# Patient Record
Sex: FEMALE | Race: WHITE | NOT HISPANIC OR LATINO | Employment: OTHER | URBAN - METROPOLITAN AREA
[De-identification: names, ages, dates, MRNs, and addresses within clinical notes are randomized per-mention and may not be internally consistent; named-entity substitution may affect disease eponyms.]

---

## 2023-08-21 ENCOUNTER — OFFICE VISIT (OUTPATIENT)
Dept: HEPATOLOGY | Facility: CLINIC | Age: 63
End: 2023-08-21
Payer: COMMERCIAL

## 2023-08-21 ENCOUNTER — HOSPITAL ENCOUNTER (OUTPATIENT)
Dept: RADIOLOGY | Facility: HOSPITAL | Age: 63
Discharge: HOME | End: 2023-08-21
Attending: NURSE PRACTITIONER
Payer: COMMERCIAL

## 2023-08-21 VITALS
SYSTOLIC BLOOD PRESSURE: 154 MMHG | WEIGHT: 132.94 LBS | DIASTOLIC BLOOD PRESSURE: 92 MMHG | HEART RATE: 106 BPM | BODY MASS INDEX: 23.55 KG/M2 | HEIGHT: 63 IN

## 2023-08-21 DIAGNOSIS — K76.9 LIVER DISEASE: ICD-10-CM

## 2023-08-21 DIAGNOSIS — R68.81 EARLY SATIETY: ICD-10-CM

## 2023-08-21 DIAGNOSIS — R10.84 GENERALIZED ABDOMINAL PAIN: ICD-10-CM

## 2023-08-21 DIAGNOSIS — R11.2 NAUSEA AND VOMITING, UNSPECIFIED VOMITING TYPE: ICD-10-CM

## 2023-08-21 DIAGNOSIS — Z85.42 HISTORY OF ENDOMETRIAL CANCER: ICD-10-CM

## 2023-08-21 DIAGNOSIS — C54.1 ENDOMETRIAL CANCER: ICD-10-CM

## 2023-08-21 DIAGNOSIS — R63.4 WEIGHT LOSS, NON-INTENTIONAL: ICD-10-CM

## 2023-08-21 DIAGNOSIS — K74.60 CIRRHOSIS OF LIVER WITH ASCITES, UNSPECIFIED HEPATIC CIRRHOSIS TYPE: Primary | ICD-10-CM

## 2023-08-21 DIAGNOSIS — I10 HYPERTENSION, UNSPECIFIED TYPE: ICD-10-CM

## 2023-08-21 DIAGNOSIS — R18.8 CIRRHOSIS OF LIVER WITH ASCITES, UNSPECIFIED HEPATIC CIRRHOSIS TYPE: Primary | ICD-10-CM

## 2023-08-21 PROCEDURE — 1159F PR MEDICATION LIST DOCUMENTED IN MEDICAL RECORD: ICD-10-PCS | Mod: CPTII,S$GLB,, | Performed by: NURSE PRACTITIONER

## 2023-08-21 PROCEDURE — 3080F DIAST BP >= 90 MM HG: CPT | Mod: CPTII,S$GLB,, | Performed by: NURSE PRACTITIONER

## 2023-08-21 PROCEDURE — 3008F PR BODY MASS INDEX (BMI) DOCUMENTED: ICD-10-PCS | Mod: CPTII,S$GLB,, | Performed by: NURSE PRACTITIONER

## 2023-08-21 PROCEDURE — 74019 RADEX ABDOMEN 2 VIEWS: CPT | Mod: TC

## 2023-08-21 PROCEDURE — 74019 RADEX ABDOMEN 2 VIEWS: CPT | Mod: 26,,, | Performed by: RADIOLOGY

## 2023-08-21 PROCEDURE — 99999 PR PBB SHADOW E&M-NEW PATIENT-LVL IV: CPT | Mod: PBBFAC,,, | Performed by: NURSE PRACTITIONER

## 2023-08-21 PROCEDURE — 3080F PR MOST RECENT DIASTOLIC BLOOD PRESSURE >= 90 MM HG: ICD-10-PCS | Mod: CPTII,S$GLB,, | Performed by: NURSE PRACTITIONER

## 2023-08-21 PROCEDURE — 74019 XR ABDOMEN FLAT AND ERECT: ICD-10-PCS | Mod: 26,,, | Performed by: RADIOLOGY

## 2023-08-21 PROCEDURE — 99205 PR OFFICE/OUTPT VISIT, NEW, LEVL V, 60-74 MIN: ICD-10-PCS | Mod: S$GLB,,, | Performed by: NURSE PRACTITIONER

## 2023-08-21 PROCEDURE — 1159F MED LIST DOCD IN RCRD: CPT | Mod: CPTII,S$GLB,, | Performed by: NURSE PRACTITIONER

## 2023-08-21 PROCEDURE — 99999 PR PBB SHADOW E&M-NEW PATIENT-LVL IV: ICD-10-PCS | Mod: PBBFAC,,, | Performed by: NURSE PRACTITIONER

## 2023-08-21 PROCEDURE — 3077F SYST BP >= 140 MM HG: CPT | Mod: CPTII,S$GLB,, | Performed by: NURSE PRACTITIONER

## 2023-08-21 PROCEDURE — 3008F BODY MASS INDEX DOCD: CPT | Mod: CPTII,S$GLB,, | Performed by: NURSE PRACTITIONER

## 2023-08-21 PROCEDURE — 99205 OFFICE O/P NEW HI 60 MIN: CPT | Mod: S$GLB,,, | Performed by: NURSE PRACTITIONER

## 2023-08-21 PROCEDURE — 3077F PR MOST RECENT SYSTOLIC BLOOD PRESSURE >= 140 MM HG: ICD-10-PCS | Mod: CPTII,S$GLB,, | Performed by: NURSE PRACTITIONER

## 2023-08-21 RX ORDER — IRBESARTAN AND HYDROCHLOROTHIAZIDE 150; 12.5 MG/1; MG/1
1 TABLET, FILM COATED ORAL
COMMUNITY
Start: 2023-05-24

## 2023-08-21 RX ORDER — ALPRAZOLAM 1 MG/1
1 TABLET ORAL DAILY PRN
COMMUNITY
Start: 2023-07-18

## 2023-08-21 RX ORDER — ONDANSETRON 4 MG/1
4 TABLET, FILM COATED ORAL EVERY 8 HOURS PRN
COMMUNITY
Start: 2023-07-18

## 2023-08-21 RX ORDER — SPIRONOLACTONE 50 MG/1
50 TABLET, FILM COATED ORAL
COMMUNITY
Start: 2022-09-29

## 2023-08-21 RX ORDER — CYCLOBENZAPRINE HCL 5 MG
5 TABLET ORAL
COMMUNITY
Start: 2023-07-18

## 2023-08-21 RX ORDER — PANTOPRAZOLE SODIUM 40 MG/1
40 TABLET, DELAYED RELEASE ORAL
COMMUNITY
Start: 2023-08-01

## 2023-08-21 NOTE — Clinical Note
Please get me a copy of her EGD and colonocopy.  I can't recall where she had it completed.  Reach out to pt for clairifcation

## 2023-08-21 NOTE — PROGRESS NOTES
"Clinic Consult:  [facility] Consultation Note    Reason for Consult:  The primary encounter diagnosis was Cirrhosis of liver with ascites, unspecified hepatic cirrhosis type. Diagnoses of Generalized abdominal pain, Weight loss, non-intentional, Early satiety, Nausea and vomiting, unspecified vomiting type, History of endometrial cancer, Hypertension, unspecified type, Liver disease, and Endometrial cancer were also pertinent to this visit.    PCP: Geoffrey Mendosa   No address on file    HPI:  This is a 63 y.o. female here for evaluation of the above  Pt is here with a family member, present throughout the visit.   She states that all of her symptoms started in Sugust 2022.  At that time, she began having nausea, vomiting, early satiety, loss of appetite and thus weight loss.  She has lost over 100 pounds since that time.   She was seen by a GI provider in [facility] and reports an EGD and coloospcy were completed, reportedly unremarkable.  I do not have that result for review.   Imaging in septemeber 2022 was completed which showed a cirrhotic liver without lesions.  Evidence of portal HTN and small volume ascites.   She denies any knopwn liver disease prior to that time.   Extensive record review notes a workup with labs that showed no significant findings for autoimmune, cholestatis or active viral infection.   Pt reports she was told she possibly had a previous exposure to hepatitis C, but was "dormant".   She has a PMH of HTN and obesity.  No ETOH.       In January, she was seen again for the same complaints and was started on Lasix and Aldactone for one month.   She conitnued with complaints of the aboce and a GES was completed, unremarkable per records review.     Today, she has continued complaints of generalized abdominal pain with loss of appetitie due to early satiety with nausea and vomiting.   Per Care Everywhere, a CT was completed on 8/15/23 that showed cirrhosis with small " "volume ascites with low in central abdominal peritoneal thickening.   No lesion or mass noted, however, not triple phase.     Of note, she has a hx of endometrial cancer, s/p hysterectomy in 2019.     Pt also reports a hx of constipation.       Review of Systems   Constitutional:  Positive for malaise/fatigue and weight loss. Negative for chills and fever.   Respiratory:  Negative for cough.    Cardiovascular:  Negative for chest pain.   Gastrointestinal:         Per HPI   Musculoskeletal:  Positive for myalgias.   Skin:  Negative for itching and rash.   Neurological:  Negative for headaches.   Psychiatric/Behavioral:  The patient is not nervous/anxious.        Medical History:   Past Medical History:   Diagnosis Date    Cirrhosis     Endometrial cancer     HTN (hypertension)     Liver disease        Surgical History:  Past Surgical History:   Procedure Laterality Date    HYSTERECTOMY         Family History:   No family history on file.    Social History:        Allergies: Reviewed    Home Medications:   Current Outpatient Medications on File Prior to Visit   Medication Sig Dispense Refill    irbesartan-hydrochlorothiazide (AVALIDE) 150-12.5 mg per tablet Take 1 tablet by mouth.      ondansetron (ZOFRAN) 4 MG tablet Take 4 mg by mouth every 8 (eight) hours as needed.      pantoprazole (PROTONIX) 40 MG tablet Take 40 mg by mouth.      ALPRAZolam (XANAX) 1 MG tablet Take 1 mg by mouth daily as needed.      cyclobenzaprine (FLEXERIL) 5 MG tablet Take 5 mg by mouth.      spironolactone (ALDACTONE) 50 MG tablet 50 mg.       No current facility-administered medications on file prior to visit.       Physical Exam:  Vital Signs:  BP (!) 154/92   Pulse 106   Ht 5' 3" (1.6 m)   Wt 60.3 kg (132 lb 15 oz)   BMI 23.55 kg/m²   Body mass index is 23.55 kg/m².  Physical Exam  Vitals reviewed.   Constitutional:       Appearance: She is well-developed. She is ill-appearing.   HENT:      Head: Atraumatic.   Eyes:      General: No " scleral icterus.  Cardiovascular:      Rate and Rhythm: Normal rate.   Pulmonary:      Effort: Pulmonary effort is normal.   Abdominal:      General: There is no distension.      Palpations: Abdomen is soft.   Musculoskeletal:         General: Normal range of motion.      Cervical back: Normal range of motion.   Skin:     General: Skin is dry.   Neurological:      Mental Status: She is alert and oriented to person, place, and time.         Labs: Pertinent labs reviewed.      Assessment:  1. Cirrhosis of liver with ascites, unspecified hepatic cirrhosis type    2. Generalized abdominal pain    3. Weight loss, non-intentional    4. Early satiety    5. Nausea and vomiting, unspecified vomiting type    6. History of endometrial cancer    7. Hypertension, unspecified type    8. Liver disease    9. Endometrial cancer         Recommendations:  Pt with multiple complaints with unclear certainty if related to cirrhosis or some other underlying GI problem.   - will start today with labs to further evaluate root cause of cirrhosis, although I suspect fatty liver given her PMH.   - will have her complete a diagnostic paracentesis with fluid studies to calculate a SAAG score and cytology to further evaluation for underlying carcinoma.   - will likely need further imaging evaluation after this initial workup completed.  Will discuss with hepatology and GI MDs once workup complete.   - will request a copy of the EGD and colonoscopy for review.     F/U in 2-4 weeks with either myself or hepatology MD if available         Thank you so much for allowing me to participate in the care of Shannan VillagomezALONA Newberry

## 2023-08-21 NOTE — H&P (VIEW-ONLY)
"Clinic Consult:  [facility] Consultation Note    Reason for Consult:  The primary encounter diagnosis was Cirrhosis of liver with ascites, unspecified hepatic cirrhosis type. Diagnoses of Generalized abdominal pain, Weight loss, non-intentional, Early satiety, Nausea and vomiting, unspecified vomiting type, History of endometrial cancer, Hypertension, unspecified type, Liver disease, and Endometrial cancer were also pertinent to this visit.    PCP: Geoffrey Mendosa   No address on file    HPI:  This is a 63 y.o. female here for evaluation of the above  Pt is here with a family member, present throughout the visit.   She states that all of her symptoms started in Sugust 2022.  At that time, she began having nausea, vomiting, early satiety, loss of appetite and thus weight loss.  She has lost over 100 pounds since that time.   She was seen by a GI provider in [facility] and reports an EGD and coloospcy were completed, reportedly unremarkable.  I do not have that result for review.   Imaging in septemeber 2022 was completed which showed a cirrhotic liver without lesions.  Evidence of portal HTN and small volume ascites.   She denies any knopwn liver disease prior to that time.   Extensive record review notes a workup with labs that showed no significant findings for autoimmune, cholestatis or active viral infection.   Pt reports she was told she possibly had a previous exposure to hepatitis C, but was "dormant".   She has a PMH of HTN and obesity.  No ETOH.       In January, she was seen again for the same complaints and was started on Lasix and Aldactone for one month.   She conitnued with complaints of the aboce and a GES was completed, unremarkable per records review.     Today, she has continued complaints of generalized abdominal pain with loss of appetitie due to early satiety with nausea and vomiting.   Per Care Everywhere, a CT was completed on 8/15/23 that showed cirrhosis with small " "volume ascites with low in central abdominal peritoneal thickening.   No lesion or mass noted, however, not triple phase.     Of note, she has a hx of endometrial cancer, s/p hysterectomy in 2019.     Pt also reports a hx of constipation.       Review of Systems   Constitutional:  Positive for malaise/fatigue and weight loss. Negative for chills and fever.   Respiratory:  Negative for cough.    Cardiovascular:  Negative for chest pain.   Gastrointestinal:         Per HPI   Musculoskeletal:  Positive for myalgias.   Skin:  Negative for itching and rash.   Neurological:  Negative for headaches.   Psychiatric/Behavioral:  The patient is not nervous/anxious.        Medical History:   Past Medical History:   Diagnosis Date    Cirrhosis     Endometrial cancer     HTN (hypertension)     Liver disease        Surgical History:  Past Surgical History:   Procedure Laterality Date    HYSTERECTOMY         Family History:   No family history on file.    Social History:        Allergies: Reviewed    Home Medications:   Current Outpatient Medications on File Prior to Visit   Medication Sig Dispense Refill    irbesartan-hydrochlorothiazide (AVALIDE) 150-12.5 mg per tablet Take 1 tablet by mouth.      ondansetron (ZOFRAN) 4 MG tablet Take 4 mg by mouth every 8 (eight) hours as needed.      pantoprazole (PROTONIX) 40 MG tablet Take 40 mg by mouth.      ALPRAZolam (XANAX) 1 MG tablet Take 1 mg by mouth daily as needed.      cyclobenzaprine (FLEXERIL) 5 MG tablet Take 5 mg by mouth.      spironolactone (ALDACTONE) 50 MG tablet 50 mg.       No current facility-administered medications on file prior to visit.       Physical Exam:  Vital Signs:  BP (!) 154/92   Pulse 106   Ht 5' 3" (1.6 m)   Wt 60.3 kg (132 lb 15 oz)   BMI 23.55 kg/m²   Body mass index is 23.55 kg/m².  Physical Exam  Vitals reviewed.   Constitutional:       Appearance: She is well-developed. She is ill-appearing.   HENT:      Head: Atraumatic.   Eyes:      General: No " scleral icterus.  Cardiovascular:      Rate and Rhythm: Normal rate.   Pulmonary:      Effort: Pulmonary effort is normal.   Abdominal:      General: There is no distension.      Palpations: Abdomen is soft.   Musculoskeletal:         General: Normal range of motion.      Cervical back: Normal range of motion.   Skin:     General: Skin is dry.   Neurological:      Mental Status: She is alert and oriented to person, place, and time.         Labs: Pertinent labs reviewed.      Assessment:  1. Cirrhosis of liver with ascites, unspecified hepatic cirrhosis type    2. Generalized abdominal pain    3. Weight loss, non-intentional    4. Early satiety    5. Nausea and vomiting, unspecified vomiting type    6. History of endometrial cancer    7. Hypertension, unspecified type    8. Liver disease    9. Endometrial cancer         Recommendations:  Pt with multiple complaints with unclear certainty if related to cirrhosis or some other underlying GI problem.   - will start today with labs to further evaluate root cause of cirrhosis, although I suspect fatty liver given her PMH.   - will have her complete a diagnostic paracentesis with fluid studies to calculate a SAAG score and cytology to further evaluation for underlying carcinoma.   - will likely need further imaging evaluation after this initial workup completed.  Will discuss with hepatology and GI MDs once workup complete.   - will request a copy of the EGD and colonoscopy for review.     F/U in 2-4 weeks with either myself or hepatology MD if available         Thank you so much for allowing me to participate in the care of Shannan VillagomezALONA Newberry

## 2023-08-22 PROBLEM — C54.1 ENDOMETRIAL CANCER: Status: ACTIVE | Noted: 2023-08-22

## 2023-08-22 PROBLEM — I10 HTN (HYPERTENSION): Status: ACTIVE | Noted: 2023-08-22

## 2023-08-22 PROBLEM — K74.60 CIRRHOSIS: Status: ACTIVE | Noted: 2023-08-22

## 2023-08-22 PROBLEM — K76.9 LIVER DISEASE: Status: ACTIVE | Noted: 2023-08-22

## 2023-08-22 NOTE — PROGRESS NOTES
Spoke with patient on [phone] to get the name of place she completed the EGD and colonoscopy which is [facility] in [facility]. Request for records have been sent.

## 2023-08-23 ENCOUNTER — HOSPITAL ENCOUNTER (OUTPATIENT)
Dept: RADIOLOGY | Facility: HOSPITAL | Age: 63
Discharge: HOME | End: 2023-08-23
Attending: NURSE PRACTITIONER
Payer: COMMERCIAL

## 2023-08-23 VITALS
DIASTOLIC BLOOD PRESSURE: 100 MMHG | BODY MASS INDEX: 23.39 KG/M2 | HEART RATE: 89 BPM | RESPIRATION RATE: 18 BRPM | HEIGHT: 63 IN | SYSTOLIC BLOOD PRESSURE: 188 MMHG | OXYGEN SATURATION: 98 % | WEIGHT: 132 LBS

## 2023-08-23 DIAGNOSIS — R18.8 CIRRHOSIS OF LIVER WITH ASCITES, UNSPECIFIED HEPATIC CIRRHOSIS TYPE: ICD-10-CM

## 2023-08-23 DIAGNOSIS — K74.60 CIRRHOSIS OF LIVER WITH ASCITES, UNSPECIFIED HEPATIC CIRRHOSIS TYPE: ICD-10-CM

## 2023-08-23 LAB
APPEARANCE FLD: NORMAL
BODY FLD TYPE: NORMAL
COLOR FLD: YELLOW
LYMPHOCYTES NFR FLD MANUAL: 66 %
MONOS+MACROS NFR FLD MANUAL: 30 %
NEUTROPHILS NFR FLD MANUAL: 4 %
PROT FLD-MCNC: 2.8 G/DL
SPECIMEN SOURCE: NORMAL
WBC # FLD: 139 /CU MM

## 2023-08-23 PROCEDURE — 87070 CULTURE OTHR SPECIMN AEROBIC: CPT | Performed by: NURSE PRACTITIONER

## 2023-08-23 PROCEDURE — 49083 ABD PARACENTESIS W/IMAGING: CPT

## 2023-08-23 PROCEDURE — 49083 ABD PARACENTESIS W/IMAGING: CPT | Mod: ,,, | Performed by: RADIOLOGY

## 2023-08-23 PROCEDURE — 87205 SMEAR GRAM STAIN: CPT | Performed by: NURSE PRACTITIONER

## 2023-08-23 PROCEDURE — 84157 ASSAY OF PROTEIN OTHER: CPT | Performed by: NURSE PRACTITIONER

## 2023-08-23 PROCEDURE — 87075 CULTR BACTERIA EXCEPT BLOOD: CPT | Performed by: NURSE PRACTITIONER

## 2023-08-23 PROCEDURE — 89051 BODY FLUID CELL COUNT: CPT | Performed by: NURSE PRACTITIONER

## 2023-08-23 PROCEDURE — 49083 US GUIDED PARACENTESIS INC IMAGING: ICD-10-PCS | Mod: ,,, | Performed by: RADIOLOGY

## 2023-08-23 NOTE — PLAN OF CARE
Pt ambulated to pre op area. Hypertensive due to not being able to tolerate PO meds. Pt complains of lower abd pain 7/10 and nausea/vomiting.

## 2023-08-23 NOTE — DISCHARGE SUMMARY
[facility] (Hospital)  Discharge Note  Short Stay    [facility]      OUTCOME: Patient tolerated treatment/procedure well without complication and is now ready for discharge.    DISPOSITION: Home or Self Care    FINAL DIAGNOSIS:  <principal problem not specified>    FOLLOWUP: In clinic    DISCHARGE INSTRUCTIONS:  No discharge procedures on file.      Clinical Reference Documents Added to Patient Instructions         Document    ABDOMINAL PARACENTESIS DISCHARGE INSTRUCTIONS (ENGLISH)            TIME SPENT ON DISCHARGE: 15 minutes    Pre Op Diagnosis: ascites     Post Op Diagnosis: same    Procedure:  para     Procedure performed by: Ashly BANDA, Deandra PEACE     Written Informed Consent Obtained: Yes     Specimen Removed:  yes     Estimated Blood Loss:  minimal     Findings: Local anesthesia and moderate sedation were used.     The patient tolerated the procedure well and there were no complications.      Disposition:  F/U in clinic    Discharge instructions:  Light activity for 24 hours.  Remove band aid in 24 hours.  No baths (showers are appropriate).    F/U with ordering physician    Sterile technique was performed in the lower abdomen, lidocaine was used as a local anesthetic.   25 ccs of light hal fluid removed for diagnostic purposes.  Pt tolerated the procedure well without immediate complications.  Please see radiologist report for details. F/u with PCP and/or ordering physician.

## 2023-08-23 NOTE — PLAN OF CARE
25 ml of light hal drainage removed from abdomen. Patient AAOx4, denies any pain or discomfort related to paracentesis. Catheter removed from left abdominal wall without difficulty, tip intact. Bandage applied, CDI. Vital signs WNL.

## 2023-08-23 NOTE — DISCHARGE INSTRUCTIONS
Please return to ER if any of these symptoms occur:  Fever over 101 degrees,  Any purulent drainage from site (pus, yellow or has foul odor), or any redness or swelling to site  Bleeding or drainage from the puncture site not controlled, If bleeding or drainage occurs at site hold pressure for 5 mins.  If bleeding continues go to ER  Pain not controlled with Aleve or Tylenol,        Do not submerge in standing water for 2 days after biopsy but you may shower.     May change bandage if it becomes soiled and bandage may be removed in 2 days.     Rest for the next couple of days. Do not lift any thing heavier than a gallon of milk.  Increase activity as tolerated.     Resume home medications and diet     Please follow up with Dr Steele with any other questions or concerns that you may have.

## 2023-08-24 LAB — PATH INTERP FLD-IMP: NORMAL

## 2023-08-28 LAB
BACTERIA FLD AEROBE CULT: NO GROWTH
GRAM STN SPEC: NORMAL
GRAM STN SPEC: NORMAL

## 2023-08-31 LAB — BACTERIA SPEC ANAEROBE CULT: NORMAL
